# Patient Record
Sex: MALE | Race: WHITE | NOT HISPANIC OR LATINO | Employment: OTHER | URBAN - METROPOLITAN AREA
[De-identification: names, ages, dates, MRNs, and addresses within clinical notes are randomized per-mention and may not be internally consistent; named-entity substitution may affect disease eponyms.]

---

## 2022-08-02 ENCOUNTER — IN-CLINIC DEVICE VISIT (OUTPATIENT)
Dept: CARDIOLOGY CLINIC | Facility: CLINIC | Age: 87
End: 2022-08-02
Payer: COMMERCIAL

## 2022-08-02 ENCOUNTER — OFFICE VISIT (OUTPATIENT)
Dept: CARDIOLOGY CLINIC | Facility: CLINIC | Age: 87
End: 2022-08-02
Payer: COMMERCIAL

## 2022-08-02 VITALS — DIASTOLIC BLOOD PRESSURE: 66 MMHG | WEIGHT: 234.7 LBS | SYSTOLIC BLOOD PRESSURE: 120 MMHG | HEART RATE: 70 BPM

## 2022-08-02 DIAGNOSIS — Z95.0 CARDIAC PACEMAKER IN SITU: Primary | ICD-10-CM

## 2022-08-02 DIAGNOSIS — R06.09 DYSPNEA ON EXERTION: Primary | ICD-10-CM

## 2022-08-02 PROCEDURE — 93000 ELECTROCARDIOGRAM COMPLETE: CPT | Performed by: INTERNAL MEDICINE

## 2022-08-02 PROCEDURE — 93280 PM DEVICE PROGR EVAL DUAL: CPT | Performed by: INTERNAL MEDICINE

## 2022-08-02 PROCEDURE — 99204 OFFICE O/P NEW MOD 45 MIN: CPT | Performed by: INTERNAL MEDICINE

## 2022-08-02 RX ORDER — ATORVASTATIN CALCIUM 10 MG/1
10 TABLET, FILM COATED ORAL DAILY
COMMUNITY
Start: 2022-07-05

## 2022-08-02 RX ORDER — LOSARTAN POTASSIUM 100 MG/1
TABLET ORAL
COMMUNITY
Start: 2022-06-29

## 2022-08-02 RX ORDER — BISOPROLOL FUMARATE 5 MG/1
5 TABLET, FILM COATED ORAL DAILY
COMMUNITY
Start: 2022-06-29

## 2022-08-02 NOTE — PROGRESS NOTES
Results for orders placed or performed in visit on 08/02/22   Cardiac EP device report    Narrative    MDT Benson Hudson INTERROGATED IN THE [place] OFFICE  PT NEW TO DEVICE CLINIC  BATTERY VOLTAGE NEARING GABINO (9 MOS, <5-13 MOS)  WILL SCHEDULE MONTHLY BATTERY CHECKS  AP-85%, >99% (>40% Collie@[employer] OFF)  ALL LEAD PARAMETERS WITHIN NORMAL LIMITS  1 AFLUTTER EPISODE ON 3/26/22 LASTING 72 SEC  PT ON ASA 81MG & BISOPROLOL  PT SEEN BY DR DYE TODAY  NORMAL DEVICE FUNCTION   GV

## 2022-08-02 NOTE — PROGRESS NOTES
Outpatient Consultation - General Cardiology   Andres [address] 80 y o  male   MRN: [id number]  Encounter: [id number]      PCP: No primary care provider on file  Risk factors:  -HTN, HLD    History of Present Illness   Physician Requesting Consult: Consults   Reason for Consult / Principal Problem: Establish with Cardiology     HPI: Marline Rosado is a 80y o  year old male, dual-chamber pacemaker placement, hyperlipidemia, hypertension presenting this does not was with Cardiology after moving back here from [facility]  The patient was living in [facility] for the past 12 years and recently moved to [facility] the live with his daughter  He does pretty well functionally and works out in the garden groans were min of that shovels including to meet is, cucumbers, basal, and eggplant  He does occasionally complain of dyspnea on exertion while working in the garden but he believes this is due to the lung disease associated with [employer] work  The patient was a  for years and he had a lot of work exposure and believed this affected his breathing  He does deny chest pains at rest or on exertion  Any did denies any heart failure symptoms of orthopnea and pnd  Past medical: Dual-chamber pacemaker place a few years ago in [place], hypertension, hyperlipidemia  Social: Drinks 1 glass of wine a day, no extensive smoking history or other illicit drug use  Family: No pertinent      Review of Systems  Review of system was conducted and was negative except for as stated in the HPI  Historical Information   No past medical history on file  No past surgical history on file    Social History     Substance and Sexual Activity   Alcohol Use Not on file     Social History     Substance and Sexual Activity   Drug Use Not on file     Social History     Tobacco Use   Smoking Status Not on file   Smokeless Tobacco Not on file     Family History: non-contributory    Meds/Allergies   Home Medications: No current outpatient medications on file  Not on File      Objective   Vitals: There were no vitals taken for this visit  Physical Exam    GEN: Bindu Noel appears well, alert and oriented x 3, pleasant and cooperative   HEENT:  Normocephalic, atraumatic, anicteric, moist mucous membranes  NECK: No JVD or carotid bruits   HEART: regular rhythm, regular rate, normal S1 and S2, , clicks, gallops or rubs, II/VI right sternal border systolic murmur, no diastolic murmur, no fadumo flint murmur   LUNGS: Clear to auscultation bilaterally; no wheezes, rales, or rhonchi; respiration nonlabored   ABDOMEN:  Normoactive bowel sounds, soft, no tenderness, no distention  EXTREMITIES: peripheral pulses palpable; no edema  NEURO: no gross focal findings; cranial nerves grossly intact   SKIN:  Dry, intact, warm to touch    Lab Results: I have personally reviewed pertinent lab results  No results found for: HSTNI0, HSTNI2, HSTNI4, HSTNI  No results found for: NTBNP  No results found for: CHOL, TRIG, HDL, LDLCALC, LDLDIRECT  No results found for: NA, K, CO2, CL, BUN, CREATININE, ALT, AST, TSH  No results found for: WBC, HGB, HCT, MCV, PLT  No results found for: INR      Imaging: I have personally reviewed pertinent reports  EKG:         DEVICE INTERROGATION      Previous STRESS TEST:  No results found for this or any previous visit  No results found for this or any previous visit  No results found for this or any previous visit  Previous Cath/PCI:  No results found for this or any previous visit  No results found for this or any previous visit  No results found for this or any previous visit  ECHO:  No results found for this or any previous visit  No results found for this or any previous visit  MARLON:  No results found for this or any previous visit  No results found for this or any previous visit  CMR:  No results found for this or any previous visit      No results found for this or any previous visit  No results found for this or any previous visit  HOLTER  No results found for this or any previous visit  No results found for this or any previous visit  Assessment/Plan         Dual-Chamber Pacer: AV-paced on ecg  Unclear why the patient had it placed in [facility] but sounds like he was fairly asymptomatic but found to have significant conduction disease   -establish with the device clinic here and will follow-up with reports     Mild Dyspnea on exertion with no anginal symptoms: 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  Possible lung disease from work exposures but also he has a faint systolic murmur on exam at the sternal border  No parvus tardus, s3/s4, on exam or radiation to the carotids  -will follow-up TTE    HTN:  -well controlled on home medications  Plan to continue losartan 100 mg and bisoprolol 5 mg daily     HLD:  -repeat lipid panel here  -lipitor 10 mg daily     TO DO :  -could discuss discontinuing ASA 81 mg in the future but to continue now since he has been tolerating   -TTE  -follow-up in 6 months        Case discussed and reviewed with Dr Dominic Goodson who agrees with my assessment and plan  Thank you for involving us in the care of your patient  Cristhian Tilley MD  Cardiology Fellow PGY-5      ==========================================================================================    Epic/ Allscripts/Care Everywhere records reviewed:     ** Please Note: Fluency DirectDictation voice to text software may have been used in the creation of this document   **

## 2022-08-15 ENCOUNTER — HOSPITAL ENCOUNTER (OUTPATIENT)
Dept: NON INVASIVE DIAGNOSTICS | Facility: CLINIC | Age: 87
Discharge: HOME | End: 2022-08-15
Payer: COMMERCIAL

## 2022-08-15 VITALS
DIASTOLIC BLOOD PRESSURE: 66 MMHG | HEART RATE: 75 BPM | HEIGHT: 69 IN | WEIGHT: 234 LBS | SYSTOLIC BLOOD PRESSURE: 120 MMHG | BODY MASS INDEX: 34.66 KG/M2

## 2022-08-15 DIAGNOSIS — R06.09 DYSPNEA ON EXERTION: ICD-10-CM

## 2022-08-15 LAB
AORTIC ROOT: 4.1 CM
AORTIC VALVE MEAN VELOCITY: 18.7 M/S
APICAL FOUR CHAMBER EJECTION FRACTION: 56 %
ASCENDING AORTA: 4.7 CM
AV AREA BY CONTINUOUS VTI: 1.6 CM2
AV AREA PEAK VELOCITY: 1.4 CM2
AV LVOT MEAN GRADIENT: 1 MMHG
AV LVOT PEAK GRADIENT: 3 MMHG
AV MEAN GRADIENT: 16 MMHG
AV PEAK GRADIENT: 27 MMHG
AV VALVE AREA: 1.61 CM2
AV VELOCITY RATIO: 0.32
DOP CALC AO PEAK VEL: 2.6 M/S
DOP CALC AO VTI: 55.39 CM
DOP CALC LVOT AREA: 4.52 CM2
DOP CALC LVOT DIAMETER: 2.4 CM
DOP CALC LVOT PEAK VEL VTI: 19.74 CM
DOP CALC LVOT PEAK VEL: 0.82 M/S
DOP CALC LVOT STROKE VOLUME: 89.26
E WAVE DECELERATION TIME: 347 MS
FRACTIONAL SHORTENING: 31 (ref 28–44)
INTERVENTRICULAR SEPTUM IN DIASTOLE (PARASTERNAL SHORT AXIS VIEW): 1.5 CM
INTERVENTRICULAR SEPTUM: 1.5 CM (ref 0.6–1.1)
LAAS-AP2: 21.4 CM2
LAAS-AP4: 14.5 CM2
LEFT ATRIUM AREA SYSTOLE SINGLE PLANE A4C: 14.5 CM2
LEFT ATRIUM SIZE: 4.2 CM
LEFT INTERNAL DIMENSION IN SYSTOLE: 3.5 CM (ref 2.1–4)
LEFT VENTRICLE DIASTOLIC VOLUME (MOD BIPLANE): 104 ML
LEFT VENTRICLE SYSTOLIC VOLUME (MOD BIPLANE): 44 ML
LEFT VENTRICULAR INTERNAL DIMENSION IN DIASTOLE: 5.1 CM (ref 3.5–6)
LEFT VENTRICULAR POSTERIOR WALL IN END DIASTOLE: 1.5 CM
LEFT VENTRICULAR STROKE VOLUME: 72 ML
LV EF: 58 %
LVSV (TEICH): 72 ML
MV E'TISSUE VEL-SEP: 5 CM/S
MV PEAK A VEL: 1.36 M/S
MV PEAK E VEL: 79 CM/S
MV STENOSIS PRESSURE HALF TIME: 101 MS
MV VALVE AREA P 1/2 METHOD: 2.18
RIGHT ATRIUM AREA SYSTOLE A4C: 10.3 CM2
RIGHT VENTRICLE ID DIMENSION: 3.8 CM
SL CV LEFT ATRIUM LENGTH A2C: 5.6 CM
SL CV LV EF: 65
SL CV PED ECHO LEFT VENTRICLE DIASTOLIC VOLUME (MOD BIPLANE) 2D: 122 ML
SL CV PED ECHO LEFT VENTRICLE SYSTOLIC VOLUME (MOD BIPLANE) 2D: 50 ML

## 2022-08-15 PROCEDURE — 93306 TTE W/DOPPLER COMPLETE: CPT

## 2022-08-15 PROCEDURE — 93306 TTE W/DOPPLER COMPLETE: CPT | Performed by: INTERNAL MEDICINE

## 2022-09-08 ENCOUNTER — IN-CLINIC DEVICE VISIT (OUTPATIENT)
Dept: CARDIOLOGY CLINIC | Facility: CLINIC | Age: 87
End: 2022-09-08

## 2022-09-08 DIAGNOSIS — Z95.0 CARDIAC PACEMAKER IN SITU: Primary | ICD-10-CM

## 2022-09-08 PROCEDURE — RECHECK: Performed by: INTERNAL MEDICINE

## 2022-09-08 NOTE — PROGRESS NOTES
Results for orders placed or performed in visit on 09/08/22   Cardiac EP device report    Narrative    MDT DUAL PM/ ACTIVE SYSTEM IS MRI CONDITIONAL  DEVICE INTERROGATED IN THE [place] OFFICE TO CHECK BATTERY STATUS- NB  BATTERY VOLTAGE NEARING GABINO (9 MOS, <5-13 MOS)  WILL SCHEDULE MONTHLY BATTERY CHECKS  AP-88%, -98% (>40% [email or web address] OFF)  ALL AVAILABLE LEAD PARAMETERS WITHIN NORMAL LIMITS  NO SIGNIFICANT HIGH RATE EPISODES  NORMAL DEVICE FUNCTION   GV

## 2022-09-20 ENCOUNTER — TELEPHONE (OUTPATIENT)
Dept: CARDIOLOGY CLINIC | Facility: CLINIC | Age: 87
End: 2022-09-20

## 2022-10-10 ENCOUNTER — IN-CLINIC DEVICE VISIT (OUTPATIENT)
Dept: CARDIOLOGY CLINIC | Facility: CLINIC | Age: 87
End: 2022-10-10

## 2022-10-10 DIAGNOSIS — Z95.0 PRESENCE OF PERMANENT CARDIAC PACEMAKER: Primary | ICD-10-CM

## 2022-10-10 PROCEDURE — RECHECK: Performed by: INTERNAL MEDICINE

## 2022-10-10 NOTE — PROGRESS NOTES
Results for orders placed or performed in visit on 10/10/22   Cardiac EP device report    Narrative    MDT DUAL PM/ ACTIVE SYSTEM IS MRI CONDITIONAL  DEVICE INTERROGATED IN THE [place] OFFICE N/B-- BATTERY VOLTAGE NEARING GABINO  WILL SCHEDULE MONTHLY BATTERY CHECKS  (8 MONS)  AP 90%  98%  ALL AVAILABLE LEAD PARAMETERS WITHIN NORMAL LIMITS  NO SIGNIFICANT HIGH RATE EPISODES  NORMAL DEVICE FUNCTION  ---BELCHER

## 2022-11-14 ENCOUNTER — IN-CLINIC DEVICE VISIT (OUTPATIENT)
Dept: CARDIOLOGY CLINIC | Facility: CLINIC | Age: 87
End: 2022-11-14

## 2022-11-14 DIAGNOSIS — Z95.0 PRESENCE OF PERMANENT CARDIAC PACEMAKER: Primary | ICD-10-CM

## 2022-11-14 NOTE — PROGRESS NOTES
Results for orders placed or performed in visit on 11/14/22   Cardiac EP device report    Narrative    MDT DUAL PM/ ACTIVE SYSTEM IS MRI CONDITIONAL  DEVICE INTERROGATED IN THE [place] OFFICE N/B  BATTERY VOLTAGE NEARING GABINO  WILL SCHEDULE MONTHLY BATTERY CHECKS (7 MTHS)  AP 89 4%  99 2 % ALL LEAD PARAMETERS WITHIN NORMAL LIMITS  NO SIGNIFICANT HIGH RATE EPISODES  NORMAL DEVICE FUNCTION   AM/NC

## 2023-01-05 ENCOUNTER — IN-CLINIC DEVICE VISIT (OUTPATIENT)
Dept: CARDIOLOGY CLINIC | Facility: CLINIC | Age: 88
End: 2023-01-05

## 2023-01-05 DIAGNOSIS — Z95.0 PRESENCE OF PERMANENT CARDIAC PACEMAKER: Primary | ICD-10-CM

## 2023-01-05 NOTE — PROGRESS NOTES
Results for orders placed or performed in visit on 01/05/23   Cardiac EP device report    Narrative    MDT DUAL PM/ ACTIVE SYSTEM IS MRI CONDITIONAL  DEVICE INTERROGATED IN THE [place] OFFICE  (NO TEST) BATTERY VOLTAGE NEARING GABINO  WILL SCHEDULE MONTHLY BATTERY CHECKS  (6 MONS) AP 86%  99%  ALL AVAILABLE LEAD PARAMETERS WITHIN NORMAL LIMITS  NORMAL DEVICE FUNCTION  ---BELCHER

## 2023-02-21 ENCOUNTER — IN-CLINIC DEVICE VISIT (OUTPATIENT)
Dept: CARDIOLOGY CLINIC | Facility: CLINIC | Age: 88
End: 2023-02-21

## 2023-02-21 ENCOUNTER — OFFICE VISIT (OUTPATIENT)
Dept: CARDIOLOGY CLINIC | Facility: CLINIC | Age: 88
End: 2023-02-21

## 2023-02-21 VITALS
DIASTOLIC BLOOD PRESSURE: 80 MMHG | WEIGHT: 244 LBS | SYSTOLIC BLOOD PRESSURE: 122 MMHG | HEART RATE: 67 BPM | OXYGEN SATURATION: 98 % | BODY MASS INDEX: 36.03 KG/M2

## 2023-02-21 DIAGNOSIS — Z95.0 PRESENCE OF PERMANENT CARDIAC PACEMAKER: Primary | ICD-10-CM

## 2023-02-21 DIAGNOSIS — I35.0 NONRHEUMATIC AORTIC VALVE STENOSIS: Primary | ICD-10-CM

## 2023-02-21 RX ORDER — TAMSULOSIN HYDROCHLORIDE 0.4 MG/1
CAPSULE ORAL
COMMUNITY
Start: 2023-01-31

## 2023-02-21 RX ORDER — CELECOXIB 200 MG/1
200 CAPSULE ORAL 2 TIMES DAILY
COMMUNITY
Start: 2023-01-31

## 2023-02-21 NOTE — PROGRESS NOTES
Cardiology Follow Up    Andres Valerio  1935  [id number]  [facility]  [facility] [address]  [phone]    No diagnosis found  Interval History: Moved from [facility] about a year ago and currently lives with his daughter  He is fairly active and works in the garden regularly  Patient denies fevers/chills, nausea/vomiting, abdominal pain, diarrhea, cough, chest pain, shortness of breath, PND, orthopnea, presyncopal symptoms, syncopal episodes  There is no problem list on file for this patient  No past medical history on file  Social History     Socioeconomic History   • Marital status: Single     Spouse name: Not on file   • Number of children: Not on file   • Years of education: Not on file   • Highest education level: Not on file   Occupational History   • Not on file   Tobacco Use   • Smoking status: Never   • Smokeless tobacco: Never   Substance and Sexual Activity   • Alcohol use: Not on file   • Drug use: Not on file   • Sexual activity: Not on file   Other Topics Concern   • Not on file   Social History Narrative   • Not on file     Social Determinants of Health     Financial Resource Strain: Not on file   Food Insecurity: Not on file   Transportation Needs: Not on file   Physical Activity: Not on file   Stress: Not on file   Social Connections: Not on file   Intimate Partner Violence: Not on file   Housing Stability: Not on file      No family history on file  No past surgical history on file  Current Outpatient Medications:   •  ASPIRIN 81 PO, Take by mouth daily, Disp: , Rfl:   •  atorvastatin (LIPITOR) 10 mg tablet, Take 10 mg by mouth daily, Disp: , Rfl:   •  bisoprolol (ZEBETA) 5 mg tablet, Take 5 mg by mouth daily, Disp: , Rfl:   •  losartan (COZAAR) 100 MG tablet, , Disp: , Rfl:   No Known Allergies    Labs:not applicable  Imaging: No results found      Review of Systems:  Review of Systems   All other systems reviewed and are negative  Physical Exam:  Physical Exam  Vitals and nursing note reviewed  Constitutional:       Appearance: Normal appearance  HENT:      Head: Normocephalic and atraumatic  Eyes:      Extraocular Movements: Extraocular movements intact  Pupils: Pupils are equal, round, and reactive to light  Cardiovascular:      Rate and Rhythm: Normal rate and regular rhythm  Heart sounds: Murmur (III/VI systolic murmur, does not radiate and no delayed pulsus parvus et tardus) heard  Pulmonary:      Effort: Pulmonary effort is normal       Breath sounds: Normal breath sounds  Abdominal:      General: Abdomen is flat  Palpations: Abdomen is soft  Neurological:      General: No focal deficit present  Mental Status: He is alert and oriented to person, place, and time  Psychiatric:         Mood and Affect: Mood normal          Behavior: Behavior normal          Discussion/Summary:      Dual-Chamber Pacer: AV-paced on ecg with wide paced qrs most likely RV apical lead    -plan for generator change at GABINO     Mild-moderate aortic stenosis: mild dyspnea on exertion in the past with little complaints today by the patient or from the daughter  Euvolemic and nothing on exam concerning that stenosis is worse  -follow-up in clinic    Moderate to severely dilated Ascending aorta  The aortic root is 4 10 cm  The ascending aorta is 4 7 cm   -surveillance and not going to be repair candidate at this point        HTN:  -well controlled on home medications   Plan to continue losartan 100 mg and bisoprolol 5 mg daily      HLD:  -well controlled  -lipitor 10 mg daily      TO DO :  -device clinic and generator change with EP in 4 months at [facility]   -follow-up in 1 year

## 2023-02-21 NOTE — PROGRESS NOTES
Results for orders placed or performed in visit on 02/21/23   Cardiac EP device report    Narrative     [place] [address],Yasir B IS MRI CONDITIONAL  N/B DEVICE INTERROGATED IN THE [facility] OFFICE  BATTERY VOLTAGE NEARING GABINO (4 MTHS)  WILL SCHEDULE MONTHLY BATTERY CHECKS  AP 76 3%  99 9% (>40%/DDDR 60) ALL LEAD PARAMETERS WITHIN NORMAL LIMITS  2 VT-NS EPISODES ON SAME DAY WITH EGMS SHOWING NSVT WITH LONGEST 31 BEATS  BPM  TAKES BISOPROLOL  EF 65% (ECHO 8/15/22)  NORMAL DEVICE FUNCTION   AM/BELCHER

## 2023-03-22 ENCOUNTER — TELEPHONE (OUTPATIENT)
Dept: CARDIOLOGY CLINIC | Facility: CLINIC | Age: 88
End: 2023-03-22

## 2023-03-22 DIAGNOSIS — I47.29 NSVT (NONSUSTAINED VENTRICULAR TACHYCARDIA) (HCC): Primary | ICD-10-CM

## 2023-03-22 RX ORDER — BISOPROLOL FUMARATE 5 MG/1
10 TABLET, FILM COATED ORAL DAILY
Qty: 240 TABLET | Refills: 2 | Status: SHIPPED | OUTPATIENT
Start: 2023-03-22

## 2023-03-22 NOTE — TELEPHONE ENCOUNTER
----- Message from Ofe Young MD sent at 2/22/2023  4:21 AM EST -----  Normal Device Function  NSVT -31 beats   PPM  Increase beta blocker if tolerated

## 2023-03-22 NOTE — TELEPHONE ENCOUNTER
Discussed increased dosing of BB with the patient's daughter  The patient is planned to take 10 mg nightly now       Carolina Rios

## 2023-04-05 ENCOUNTER — IN-CLINIC DEVICE VISIT (OUTPATIENT)
Dept: CARDIOLOGY CLINIC | Facility: CLINIC | Age: 88
End: 2023-04-05

## 2023-04-05 DIAGNOSIS — Z95.0 PRESENCE OF PERMANENT CARDIAC PACEMAKER: Primary | ICD-10-CM

## 2023-04-05 NOTE — PROGRESS NOTES
Results for orders placed or performed in visit on 04/05/23   Cardiac EP device report    Narrative     Yasir [place] B IS MRI CONDITIONAL  N/B DEVICE INTERROGATED IN THE [address]  BATTERY VOLTAGE NEARING GABINO (3 MTHS)  WILL SCHEDULE MONTHLY BATTERY CHECKS  AP 75 9%  100% (>40%/DDDR 60) ALL LEAD PARAMETERS WITHIN NORMAL LIMITS  NO SIGNIFICANT HIGH RATE EPISODES  NORMAL DEVICE FUNCTION   AM/GV

## 2023-05-09 ENCOUNTER — IN-CLINIC DEVICE VISIT (OUTPATIENT)
Dept: CARDIOLOGY CLINIC | Facility: CLINIC | Age: 88
End: 2023-05-09

## 2023-05-09 DIAGNOSIS — Z95.0 PRESENCE OF PERMANENT CARDIAC PACEMAKER: Primary | ICD-10-CM

## 2023-05-09 NOTE — PROGRESS NOTES
Results for orders placed or performed in visit on 05/09/23   Cardiac EP device report    Narrative    MDT DUAL PM/ ACTIVE SYSTEM IS MRI CONDITIONAL  DEVICE INTERROGATED IN THE [place] OFFICE (NO TEST)  BATTERY VOLTAGE NEARING GABINO (1M)  WILL SCHEDULE MONTHLY BATTERY CHECKS  ALL LEAD PARAMETERS WITHIN NORMAL LIMITS  ONE NEW AT/AF EPISODE WITH EGM SHOWING AFLUTTER FOR 1 MIN  AT/AF BURDEN <0 1% SINCE 4/5/23  TAKES BISOPROLOL & ASA  NO PROGRAMMING CHANGES MADE TO DEVICE PARAMETERS  NORMAL DEVICE FUNCTION   AM/NC

## 2023-06-09 ENCOUNTER — IN-CLINIC DEVICE VISIT (OUTPATIENT)
Dept: CARDIOLOGY CLINIC | Facility: CLINIC | Age: 88
End: 2023-06-09

## 2023-06-09 DIAGNOSIS — Z95.0 CARDIAC PACEMAKER IN SITU: Primary | ICD-10-CM

## 2023-06-09 PROCEDURE — RECHECK: Performed by: INTERNAL MEDICINE

## 2023-06-09 NOTE — PROGRESS NOTES
Results for orders placed or performed in visit on 06/09/23   Cardiac EP device report    Narrative    MDT DUAL PM/ ACTIVE SYSTEM IS MRI CONDITIONAL  DEVICE INTERROGATED IN THE [place] OFFICE TO CHECK BATTERY STATUS- NB  BATTERY VOLTAGE NEARING GABINO (1 MO, <1-2 MOS)  WILL SCHEDULE MONTHLY BATTERY CHECKS  AP-79%, -100% (>40% Wallace@[employer] OFF)  ALL AVAILABLE LEAD PARAMETERS WITHIN NORMAL LIMITS  1 AFLUTTER EPISODE LASTING 1 5 MINS TREATED SUCCESSFULLY W/ rATP  PT ON ASA 81MG & BISOPROLOL  NORMAL DEVICE FUNCTION   GV

## 2023-06-19 DIAGNOSIS — I48.0 PAROXYSMAL ATRIAL FIBRILLATION (HCC): Primary | ICD-10-CM

## 2023-06-19 NOTE — PROGRESS NOTES
His device detected a short episode of atrial fibrillation and patient was asymptomatic  I discussed with the patient's daughter in-law who actively participates in the patient's healthcare decisions about the atrial fibrillation  I proposed the option of staring AC considering his CHADSVASC of 2 for his age and HTN which puts the patient at 4% annual stroke risk in comparison for his HASBLED of 2 with a bleeding risk around 4%  The other option I proposed was no AC and continue monitoring for more episodes of atrial fibrillation  The family elected to start [facility] and I Instructed them to start eliquis 5 mg BID and stop baby aspirin  The patient was just taking baby aspirin only for primary prevention       Heather Leahy

## 2023-07-19 ENCOUNTER — IN-CLINIC DEVICE VISIT (OUTPATIENT)
Dept: CARDIOLOGY CLINIC | Facility: CLINIC | Age: 88
End: 2023-07-19
Payer: COMMERCIAL

## 2023-07-19 ENCOUNTER — TELEPHONE (OUTPATIENT)
Dept: CARDIOLOGY CLINIC | Facility: CLINIC | Age: 88
End: 2023-07-19

## 2023-07-19 ENCOUNTER — OFFICE VISIT (OUTPATIENT)
Dept: CARDIOLOGY CLINIC | Facility: CLINIC | Age: 88
End: 2023-07-19
Payer: COMMERCIAL

## 2023-07-19 VITALS
BODY MASS INDEX: 36.29 KG/M2 | SYSTOLIC BLOOD PRESSURE: 130 MMHG | HEIGHT: 69 IN | HEART RATE: 132 BPM | WEIGHT: 245 LBS | DIASTOLIC BLOOD PRESSURE: 78 MMHG

## 2023-07-19 DIAGNOSIS — I47.29 NSVT (NONSUSTAINED VENTRICULAR TACHYCARDIA) (HCC): ICD-10-CM

## 2023-07-19 DIAGNOSIS — I48.0 PAROXYSMAL ATRIAL FIBRILLATION (HCC): ICD-10-CM

## 2023-07-19 DIAGNOSIS — Z95.0 PRESENCE OF PERMANENT CARDIAC PACEMAKER: Primary | ICD-10-CM

## 2023-07-19 DIAGNOSIS — Z95.0 CARDIAC PACEMAKER IN SITU: Primary | ICD-10-CM

## 2023-07-19 DIAGNOSIS — E78.5 HYPERLIPIDEMIA, UNSPECIFIED HYPERLIPIDEMIA TYPE: ICD-10-CM

## 2023-07-19 DIAGNOSIS — I10 HYPERTENSION, UNSPECIFIED TYPE: ICD-10-CM

## 2023-07-19 PROCEDURE — 99214 OFFICE O/P EST MOD 30 MIN: CPT | Performed by: PHYSICIAN ASSISTANT

## 2023-07-19 PROCEDURE — RECHECK: Performed by: INTERNAL MEDICINE

## 2023-07-19 NOTE — PATIENT INSTRUCTIONS
Your pacemaker battery is at the time where it needs to be replaced  This will be scheduled in the upcoming weeks  Nothing to eat or drink after midnight the evening before the procedure  Hold Eliquis the night before the procedure

## 2023-07-19 NOTE — PROGRESS NOTES
EPS Consultation/New Patient Evaluation   Heart & Vascular [address] [facility]  [place]    Name: Denise Wahl  : 1935  MRN: [id number]    ASSESSMENT:  1. Presence of permanent cardiac pacemaker  POCT ECG      2. Hyperlipidemia, unspecified hyperlipidemia type        3. Hypertension, unspecified type        4. Paroxysmal atrial fibrillation (HCC)            PLAN:  1. Medtronic dual chamber PPM at RRT  -- implanted 2018 in [place] for bradycardia  -- chronically elevated atrial lead threshold (>2.0 V @ 0.4 ms, programmed 4.75 V @ 0.4 ms)  -- pacemaker dependent (Ap 79%,  99%)  -- echo 2022 EF 65%  -- reached RRT 6/10/2023    2. PAF on Eliquis  -- 1.5 minutes noted on device interrogation in 2023  -- [id number] = 3 (age, HTN)  -- Eliquis and bisoprolol    3. HTN  -- BP controlled  -- continue bisoprolol and losartan    4. HLD  -- continue atorvastatin    5. Mild to moderate AS    6. Moderate to severely dilated ascending aorta      Andres Valerio presents for evaluation of his pacemaker today. His device reached RRT on Dee 10, 2023. The device was implanted in  in [place]. He is pacemaker dependent. He paces in the atrium approximately 80% of time and 100% in the ventricle. His atrial lead threshold is chronically elevated at > 2.0 V @ 0.4 ms (programmed 4.75 V @ 0.4 ms). I discussed the case with Dr. Lou Pino. He will return to the office to check atrial lead threshold in both unipolar and bipolar configuration. He will likely undergo generator change with Dr. Chuy Lobato next week, but based on reinterrogation of device we will consider atrial lead extraction and reimplantation. I discussed the procedure with both the patient and his daughter-in-law and they are in agreement. Regarding his paroxysmal atrial fibrillation, he will continue Eliquis 5 mg every 12 hours. He is on bisoprolol 10 mg daily.      His blood pressure is controlled today. He will continue bisoprolol and losartan. The office will contact him to schedule generator change. This will be arranged through his daughter-in-law. He will hold Eliquis the evening prior to the procedure. He will follow-up with the device clinic 2 weeks after generator change for wound check and device interrogation. He will call the office with any questions or concerns in the interim. CC/HPI:   Kae Enriquez is an 80year old [place] speaking male with a history of HTN, HLD, mild to moderate AS, moderate to severely dilated ascending aorta, Medtronic dual chamber PPM implanted in 2018 secondary to bradycardia (records not available at this time), and brief PAF on device interrogation now on Eliquis. His device reached RRT in June and he presents for evaluation. He lived in [place] for the past 12 years and moved back to the [address] [place] [address] in 2022. He lives with his son and daughter-in-law. He has several children in the area. He previously worked as a . He remains active and works in his garden. The pacemaker was implanted on 1/8/2018 by Dr. Purdence Blackwood in [place]. His daughter reports this was implanted for bradycardia. Medical records are not available at this time. The device is MR conditional. He has followed with our device clinic since August 2022. The atrial lead threshold is chronically elevated (>2.0 V @ 0.4 ms). He is A paced approximately 80% of the time and V paced 100% of the time. Echocardiogram in August 2022 documented an LVEF of 65%, mild to moderate AS, and moderate to severely dilated aortic root. He was noted to have brief PAF lasting 1.5 minutes in June 2023. He was started on Eliquis 5 mg every 12 hours. Aspirin was discontinued. His DIUMW4Lmub is 1 (age, HTN). He has not had any bleeding issues. Today, his device was interrogated in our device clinic and it was found to have reached RRT on 6/10/2023.  He presents for urgent evaluation regarding generator change. He is accompanied by his daughter-in-law. He has been feeling well. He denies any problems with the implant site. He denies chest pain, palpitations, lightheadedness, dizziness, syncope, or shortness of breath. EKG: AV paced rhythm HR 60 bpm    Device interrogation from today reviewed - see interrogation in chart for further details      Review of Systems   Constitutional: Negative. HENT: Negative. Eyes: Negative. Respiratory: Negative for chest tightness, shortness of breath and wheezing. Cardiovascular: Negative for chest pain and palpitations. Gastrointestinal: Negative for abdominal pain, nausea and vomiting. Endocrine: Negative. Genitourinary: Negative. Musculoskeletal: Negative. Skin: Negative for rash. Neurological: Negative for dizziness, syncope and weakness. Hematological: Negative. Psychiatric/Behavioral: Negative. Objective:     Vitals: Blood pressure 130/78, pulse (!) 132, height 5' 9" (1.753 m), weight 111 kg (245 lb). , Body mass index is 36.18 kg/m².,  HR is 60 bpm, artificially elevated     Physical Exam:   GEN: NAD, alert and oriented x 3, well appearing  SKIN: warm, dry without significant lesions or rashes. Left chest implant site C/D/I, well healed, no signs of infection. HEENT: NCAT, PERRL, EOMs intact  NECK: supple, no JVD appreciated  CARDIOVASCULAR: RRR, normal S1, S2 without murmurs, rubs, or gallops   LUNGS: CTA bilaterally without wheezes, rhonchi, or rales  ABDOMEN: Soft, nontender, nondistended.    EXTREMITIES/VASCULAR: perfused without clubbing, cyanosis, or LE edema b/l  PSYCH: Normal mood and affect  NEURO: CN ll-Xll grossly intact      Medications:      Current Outpatient Medications:   •  apixaban (Eliquis) 5 mg, Take 1 tablet (5 mg total) by mouth 2 (two) times a day, Disp: 120 tablet, Rfl: 5  •  atorvastatin (LIPITOR) 10 mg tablet, Take 10 mg by mouth daily, Disp: , Rfl:   •  bisoprolol (ZEBETA) 5 mg tablet, Take 2 tablets (10 mg total) by mouth daily, Disp: 240 tablet, Rfl: 2  •  celecoxib (CeleBREX) 200 mg capsule, Take 200 mg by mouth 2 (two) times a day, Disp: , Rfl:   •  losartan (COZAAR) 100 MG tablet, , Disp: , Rfl:   •  tamsulosin (FLOMAX) 0.4 mg, TAKE 1 CAPSULE BY MOUTH EVERY DAY AT NIGHT, Disp: , Rfl:   •  Cholecalciferol 250 MCG (54341 UT) CAPS, Take 10,000 Units by mouth daily, Disp: , Rfl:        Historical Information   History reviewed. No pertinent past medical history. History reviewed. No pertinent surgical history. Social History     Substance and Sexual Activity   Alcohol Use Yes   • Alcohol/week: 1.0 standard drink of alcohol   • Types: 1 Glasses of wine per week    Comment: 1 glass a day     Social History     Substance and Sexual Activity   Drug Use Not on file     Social History     Tobacco Use   Smoking Status Never   Smokeless Tobacco Never       History reviewed. No pertinent family history. Labs & Results:  Below is the patient's most recent value for Albumin, ALT, AST, BUN, Calcium, Chloride, Cholesterol, CO2, Creatinine, GFR, Glucose, HDL, Hematocrit, Hemoglobin, Hemoglobin A1C, LDL, Magnesium, Phosphorus, Platelets, Potassium, PSA, Sodium, Triglycerides, and WBC. Lab Results   Component Value Date    HGBA1C 5.5 01/30/2023     Note: for a comprehensive list of the patient's lab results, access the Results Review activity. Cardiac testing:    ECHO 8/15/2022:   •  Left Ventricle: Left ventricular cavity size is normal. Wall thickness is moderately increased. There is moderate concentric hypertrophy. The left ventricular ejection fraction is 65%. Systolic function is vigorous. Wall motion is normal. Diastolic function is normal.  •  Left Atrium: The atrium is mildly dilated. •  Aortic Valve: The aortic valve is trileaflet. The leaflets are mild to moderately thickened. The leaflets are mild to moderately calcified.  The leaflets exhibit mild to moderately reduced mobility There is trace regurgitation. There is mild to moderate stenosis. The aortic valve mean gradient is 16 mmHg. The aortic valve velocity is increased due to stenosis. •  Mitral Valve: There is moderate annular calcification. There is trace regurgitation. •  Tricuspid Valve: There is trace regurgitation. •  Aorta: The aortic root is mildly dilated. The ascending aorta is moderate to severely dilated. The aortic root is 4.10 cm. The ascending aorta is 4.7 cm.

## 2023-07-19 NOTE — H&P (VIEW-ONLY)
EPS Consultation/New Patient Evaluation   Heart & Vascular [address] [facility]  AVRIL Jimenez [place]    Name: Jairo Louis  : 1935  MRN: [id number]    ASSESSMENT:  1. Presence of permanent cardiac pacemaker  POCT ECG      2. Hyperlipidemia, unspecified hyperlipidemia type        3. Hypertension, unspecified type        4. Paroxysmal atrial fibrillation (HCC)            PLAN:  1. Medtronic dual chamber PPM at RRT  -- implanted 2018 in [place] for bradycardia  -- chronically elevated atrial lead threshold (>2.0 V @ 0.4 ms, programmed 4.75 V @ 0.4 ms)  -- pacemaker dependent (Ap 79%,  99%)  -- echo 2022 EF 65%  -- reached RRT 6/10/2023    2. PAF on Eliquis  -- 1.5 minutes noted on device interrogation in 2023  -- [id number] = 3 (age, HTN)  -- Eliquis and bisoprolol    3. HTN  -- BP controlled  -- continue bisoprolol and losartan    4. HLD  -- continue atorvastatin    5. Mild to moderate AS    6. Moderate to severely dilated ascending aorta      Andres Valerio presents for evaluation of his pacemaker today. His device reached RRT on Dee 10, 2023. The device was implanted in  in [place]. He is pacemaker dependent. He paces in the atrium approximately 80% of time and 100% in the ventricle. His atrial lead threshold is chronically elevated at > 2.0 V @ 0.4 ms (programmed 4.75 V @ 0.4 ms). I discussed the case with Dr. Lexy Woods. He will return to the office to check atrial lead threshold in both unipolar and bipolar configuration. He will likely undergo generator change with Dr. Nadia Rodriguez next week, but based on reinterrogation of device we will consider atrial lead extraction and reimplantation. I discussed the procedure with both the patient and his daughter-in-law and they are in agreement. Regarding his paroxysmal atrial fibrillation, he will continue Eliquis 5 mg every 12 hours. He is on bisoprolol 10 mg daily.      His blood pressure is controlled today. He will continue bisoprolol and losartan. The office will contact him to schedule generator change. This will be arranged through his daughter-in-law. He will hold Eliquis the evening prior to the procedure. He will follow-up with the device clinic 2 weeks after generator change for wound check and device interrogation. He will call the office with any questions or concerns in the interim. CC/HPI:   Army Blackman is an 80year old [place] speaking male with a history of HTN, HLD, mild to moderate AS, moderate to severely dilated ascending aorta, Medtronic dual chamber PPM implanted in 2018 secondary to bradycardia (records not available at this time), and brief PAF on device interrogation now on Eliquis. His device reached RRT in June and he presents for evaluation. He lived in [place] for the past 12 years and moved back to the [address] [place] [address] in 2022. He lives with his son and daughter-in-law. He has several children in the area. He previously worked as a . He remains active and works in his garden. The pacemaker was implanted on 1/8/2018 by Dr. Aixa Santana in [place]. His daughter reports this was implanted for bradycardia. Medical records are not available at this time. The device is MR conditional. He has followed with our device clinic since August 2022. The atrial lead threshold is chronically elevated (>2.0 V @ 0.4 ms). He is A paced approximately 80% of the time and V paced 100% of the time. Echocardiogram in August 2022 documented an LVEF of 65%, mild to moderate AS, and moderate to severely dilated aortic root. He was noted to have brief PAF lasting 1.5 minutes in June 2023. He was started on Eliquis 5 mg every 12 hours. Aspirin was discontinued. His [id number] is 1 (age, HTN). He has not had any bleeding issues. Today, his device was interrogated in our device clinic and it was found to have reached RRT on 6/10/2023.  He presents for urgent evaluation regarding generator change. He is accompanied by his daughter-in-law. He has been feeling well. He denies any problems with the implant site. He denies chest pain, palpitations, lightheadedness, dizziness, syncope, or shortness of breath. EKG: AV paced rhythm HR 60 bpm    Device interrogation from today reviewed - see interrogation in chart for further details      Review of Systems   Constitutional: Negative. HENT: Negative. Eyes: Negative. Respiratory: Negative for chest tightness, shortness of breath and wheezing. Cardiovascular: Negative for chest pain and palpitations. Gastrointestinal: Negative for abdominal pain, nausea and vomiting. Endocrine: Negative. Genitourinary: Negative. Musculoskeletal: Negative. Skin: Negative for rash. Neurological: Negative for dizziness, syncope and weakness. Hematological: Negative. Psychiatric/Behavioral: Negative. Objective:     Vitals: Blood pressure 130/78, pulse (!) 132, height 5' 9" (1.753 m), weight 111 kg (245 lb). , Body mass index is 36.18 kg/m².,  HR is 60 bpm, artificially elevated     Physical Exam:   GEN: NAD, alert and oriented x 3, well appearing  SKIN: warm, dry without significant lesions or rashes. Left chest implant site C/D/I, well healed, no signs of infection. HEENT: NCAT, PERRL, EOMs intact  NECK: supple, no JVD appreciated  CARDIOVASCULAR: RRR, normal S1, S2 without murmurs, rubs, or gallops   LUNGS: CTA bilaterally without wheezes, rhonchi, or rales  ABDOMEN: Soft, nontender, nondistended.    EXTREMITIES/VASCULAR: perfused without clubbing, cyanosis, or LE edema b/l  PSYCH: Normal mood and affect  NEURO: CN ll-Xll grossly intact      Medications:      Current Outpatient Medications:   •  apixaban (Eliquis) 5 mg, Take 1 tablet (5 mg total) by mouth 2 (two) times a day, Disp: 120 tablet, Rfl: 5  •  atorvastatin (LIPITOR) 10 mg tablet, Take 10 mg by mouth daily, Disp: , Rfl:   •  bisoprolol (ZEBETA) 5 mg tablet, Take 2 tablets (10 mg total) by mouth daily, Disp: 240 tablet, Rfl: 2  •  celecoxib (CeleBREX) 200 mg capsule, Take 200 mg by mouth 2 (two) times a day, Disp: , Rfl:   •  losartan (COZAAR) 100 MG tablet, , Disp: , Rfl:   •  tamsulosin (FLOMAX) 0.4 mg, TAKE 1 CAPSULE BY MOUTH EVERY DAY AT NIGHT, Disp: , Rfl:   •  Cholecalciferol 250 MCG (76631 UT) CAPS, Take 10,000 Units by mouth daily, Disp: , Rfl:        Historical Information   History reviewed. No pertinent past medical history. History reviewed. No pertinent surgical history. Social History     Substance and Sexual Activity   Alcohol Use Yes   • Alcohol/week: 1.0 standard drink of alcohol   • Types: 1 Glasses of wine per week    Comment: 1 glass a day     Social History     Substance and Sexual Activity   Drug Use Not on file     Social History     Tobacco Use   Smoking Status Never   Smokeless Tobacco Never       History reviewed. No pertinent family history. Labs & Results:  Below is the patient's most recent value for Albumin, ALT, AST, BUN, Calcium, Chloride, Cholesterol, CO2, Creatinine, GFR, Glucose, HDL, Hematocrit, Hemoglobin, Hemoglobin A1C, LDL, Magnesium, Phosphorus, Platelets, Potassium, PSA, Sodium, Triglycerides, and WBC. Lab Results   Component Value Date    HGBA1C 5.5 01/30/2023     Note: for a comprehensive list of the patient's lab results, access the Results Review activity. Cardiac testing:    ECHO 8/15/2022:   •  Left Ventricle: Left ventricular cavity size is normal. Wall thickness is moderately increased. There is moderate concentric hypertrophy. The left ventricular ejection fraction is 65%. Systolic function is vigorous. Wall motion is normal. Diastolic function is normal.  •  Left Atrium: The atrium is mildly dilated. •  Aortic Valve: The aortic valve is trileaflet. The leaflets are mild to moderately thickened. The leaflets are mild to moderately calcified.  The leaflets exhibit mild to moderately reduced mobility There is trace regurgitation. There is mild to moderate stenosis. The aortic valve mean gradient is 16 mmHg. The aortic valve velocity is increased due to stenosis. •  Mitral Valve: There is moderate annular calcification. There is trace regurgitation. •  Tricuspid Valve: There is trace regurgitation. •  Aorta: The aortic root is mildly dilated. The ascending aorta is moderate to severely dilated. The aortic root is 4.10 cm. The ascending aorta is 4.7 cm.

## 2023-07-19 NOTE — TELEPHONE ENCOUNTER
----- Message from Angelica Pagan PA-C sent at 7/19/2023  3:57 PM EDT -----  Hi,    Please schedule this patient for generator change with first available EP.      Medtronic dual chamber PPM  Reached RRT June 10th  Dependent    Needs generator change +/- atrial lead revision - need to discuss with the attending    Hold Eliquis the night before the procedure    Thanks,  Mary Thakur

## 2023-07-19 NOTE — PROGRESS NOTES
Results for orders placed or performed in visit on 07/19/23   Cardiac EP device report    Narrative    MDT DUAL PM/ ACTIVE SYSTEM IS MRI CONDITIONAL  DEVICE INTERROGATED IN THE [facility] OFFICE. BATTERY INDICATED GABINO AS OF 06/10/23. AP 79%  99%. ALL AVAILABLE LEAD PARAMETERS WITHIN NORMAL LIMITS. NO SIGNIFICANT HIGH RATE EPISODES. NORMAL DEVICE FUNCTION.  PATIENT TO SEE Anant Wolfe TODAY---BELCHER

## 2023-07-20 ENCOUNTER — IN-CLINIC DEVICE VISIT (OUTPATIENT)
Dept: CARDIOLOGY CLINIC | Facility: CLINIC | Age: 88
End: 2023-07-20

## 2023-07-20 DIAGNOSIS — Z95.0 PRESENCE OF PERMANENT CARDIAC PACEMAKER: Primary | ICD-10-CM

## 2023-07-20 PROBLEM — E78.5 HYPERLIPIDEMIA: Status: ACTIVE | Noted: 2023-07-20

## 2023-07-20 PROBLEM — I10 HYPERTENSION: Status: ACTIVE | Noted: 2023-07-20

## 2023-07-20 PROBLEM — I48.0 PAROXYSMAL ATRIAL FIBRILLATION (HCC): Status: ACTIVE | Noted: 2023-07-20

## 2023-07-20 PROCEDURE — RECHECK: Performed by: INTERNAL MEDICINE

## 2023-07-20 PROCEDURE — 93000 ELECTROCARDIOGRAM COMPLETE: CPT | Performed by: PHYSICIAN ASSISTANT

## 2023-07-20 NOTE — PROGRESS NOTES
Results for orders placed or performed in visit on 07/20/23   Cardiac EP device report    Narrative    MDT DUAL PM/ ACTIVE SYSTEM IS MRI CONDITIONAL  DEVICE INTERROGATED IN THE [place] OFFICE. N/B-- ATRIAL LEAD THRESHOLD ELEVATED BUT STABLE. TESTED BOTH UNIPOLAR AND BIPOLAR. DECREASE MADE TO AMPLITUDE AND PULSE WIDTH TO PROMOTE DEVICE LONGEVITY WHILE MAINTAINING AN APPROPRIATE SAFETY MARGIN.  PATIENTS FAMILY TO DISCUSS LEAD EXTRACTION OR JUST GEN CHANGE---BELCHER

## 2023-07-25 ENCOUNTER — PREP FOR PROCEDURE (OUTPATIENT)
Dept: CARDIOLOGY CLINIC | Facility: CLINIC | Age: 88
End: 2023-07-25

## 2023-07-25 DIAGNOSIS — I47.29 NSVT (NONSUSTAINED VENTRICULAR TACHYCARDIA) (HCC): ICD-10-CM

## 2023-07-25 DIAGNOSIS — Z95.0 PRESENCE OF PERMANENT CARDIAC PACEMAKER: Primary | ICD-10-CM

## 2023-07-25 NOTE — TELEPHONE ENCOUNTER
Jordon Shaikh approved [facility] # [id number] for DC pacer gen change/[id number] @ B w/Dr. Lara Carroll.   Valid 7/25/23-1/21/24

## 2023-07-25 NOTE — TELEPHONE ENCOUNTER
Patient scheduled for Dual chamber pacer gen change at [facility] on     08/03/2023 with Dr Sharif Bañuelos. Patient daughter in law, TWENTY [employer]ESTS, aware of general instructions, faxed lab and instructions to her. Meds holds:Losartan to be hold 24hrs prior     Can we please check insurance for approval.     Dr Sharif Bañuelos, can you please advise [facility] holds for this patient procedure. Thank you.

## 2023-07-28 NOTE — TELEPHONE ENCOUNTER
patient daughter in law, Obdulia, aware Eliquis to be hold the night prior and the morning of the procedure.

## 2023-08-03 ENCOUNTER — HOSPITAL ENCOUNTER (OUTPATIENT)
Facility: HOSPITAL | Age: 88
Setting detail: OUTPATIENT SURGERY
Discharge: HOME | End: 2023-08-03
Attending: INTERNAL MEDICINE | Admitting: INTERNAL MEDICINE
Payer: COMMERCIAL

## 2023-08-03 ENCOUNTER — ANESTHESIA (OUTPATIENT)
Dept: NON INVASIVE DIAGNOSTICS | Facility: HOSPITAL | Age: 88
End: 2023-08-03
Payer: COMMERCIAL

## 2023-08-03 ENCOUNTER — ANESTHESIA EVENT (OUTPATIENT)
Dept: NON INVASIVE DIAGNOSTICS | Facility: HOSPITAL | Age: 88
End: 2023-08-03
Payer: COMMERCIAL

## 2023-08-03 VITALS
WEIGHT: 235 LBS | SYSTOLIC BLOOD PRESSURE: 113 MMHG | RESPIRATION RATE: 17 BRPM | OXYGEN SATURATION: 91 % | TEMPERATURE: 97.6 F | DIASTOLIC BLOOD PRESSURE: 68 MMHG | BODY MASS INDEX: 35.61 KG/M2 | HEIGHT: 68 IN | HEART RATE: 59 BPM

## 2023-08-03 DIAGNOSIS — Z45.010 PACEMAKER AT END OF BATTERY LIFE: Primary | ICD-10-CM

## 2023-08-03 DIAGNOSIS — I47.29 NSVT (NONSUSTAINED VENTRICULAR TACHYCARDIA) (HCC): ICD-10-CM

## 2023-08-03 DIAGNOSIS — Z95.0 PRESENCE OF PERMANENT CARDIAC PACEMAKER: ICD-10-CM

## 2023-08-03 PROBLEM — I35.0 AORTIC STENOSIS: Status: ACTIVE | Noted: 2023-08-03

## 2023-08-03 PROBLEM — E66.9 CLASS 2 OBESITY WITH BODY MASS INDEX (BMI) OF 35.0 TO 35.9 IN ADULT: Status: ACTIVE | Noted: 2023-08-03

## 2023-08-03 LAB
ANION GAP SERPL CALCULATED.3IONS-SCNC: 5 MMOL/L
ATRIAL RATE: 63 BPM
ATRIAL RATE: 69 BPM
BUN SERPL-MCNC: 20 MG/DL (ref 5–25)
CALCIUM SERPL-MCNC: 8.8 MG/DL (ref 8.3–10.1)
CHLORIDE SERPL-SCNC: 112 MMOL/L (ref 96–108)
CO2 SERPL-SCNC: 27 MMOL/L (ref 21–32)
CREAT SERPL-MCNC: 0.79 MG/DL (ref 0.6–1.3)
ERYTHROCYTE [DISTWIDTH] IN BLOOD BY AUTOMATED COUNT: 13.6 % (ref 11.6–15.1)
GFR SERPL CREATININE-BSD FRML MDRD: 80 ML/MIN/1.73SQ M
GLUCOSE P FAST SERPL-MCNC: 95 MG/DL (ref 65–99)
GLUCOSE SERPL-MCNC: 95 MG/DL (ref 65–140)
HCT VFR BLD AUTO: 50.6 % (ref 36.5–49.3)
HGB BLD-MCNC: 17.2 G/DL (ref 12–17)
INR PPP: 1.09 (ref 0.84–1.19)
MCH RBC QN AUTO: 32 PG (ref 26.8–34.3)
MCHC RBC AUTO-ENTMCNC: 34 G/DL (ref 31.4–37.4)
MCV RBC AUTO: 94 FL (ref 82–98)
P AXIS: 48 DEGREES
PLATELET # BLD AUTO: 164 THOUSANDS/UL (ref 149–390)
PMV BLD AUTO: 9.9 FL (ref 8.9–12.7)
POTASSIUM SERPL-SCNC: 4.1 MMOL/L (ref 3.5–5.3)
PR INTERVAL: 206 MS
PR INTERVAL: 206 MS
PROTHROMBIN TIME: 14.3 SECONDS (ref 11.6–14.5)
QRS AXIS: -61 DEGREES
QRS AXIS: -65 DEGREES
QRSD INTERVAL: 208 MS
QRSD INTERVAL: 208 MS
QT INTERVAL: 498 MS
QT INTERVAL: 524 MS
QTC INTERVAL: 533 MS
QTC INTERVAL: 536 MS
RBC # BLD AUTO: 5.38 MILLION/UL (ref 3.88–5.62)
SODIUM SERPL-SCNC: 144 MMOL/L (ref 135–147)
T WAVE AXIS: 96 DEGREES
T WAVE AXIS: 98 DEGREES
VENTRICULAR RATE: 63 BPM
VENTRICULAR RATE: 69 BPM
WBC # BLD AUTO: 6.32 THOUSAND/UL (ref 4.31–10.16)

## 2023-08-03 PROCEDURE — 93005 ELECTROCARDIOGRAM TRACING: CPT

## 2023-08-03 PROCEDURE — 80048 BASIC METABOLIC PNL TOTAL CA: CPT | Performed by: PHYSICIAN ASSISTANT

## 2023-08-03 PROCEDURE — 85610 PROTHROMBIN TIME: CPT | Performed by: PHYSICIAN ASSISTANT

## 2023-08-03 PROCEDURE — C1785 PMKR, DUAL, RATE-RESP: HCPCS | Performed by: INTERNAL MEDICINE

## 2023-08-03 PROCEDURE — 85027 COMPLETE CBC AUTOMATED: CPT | Performed by: PHYSICIAN ASSISTANT

## 2023-08-03 PROCEDURE — 33228 REMV&REPLC PM GEN DUAL LEAD: CPT | Performed by: INTERNAL MEDICINE

## 2023-08-03 PROCEDURE — 93010 ELECTROCARDIOGRAM REPORT: CPT | Performed by: INTERNAL MEDICINE

## 2023-08-03 DEVICE — ENVELOPE CMRM6122 ABSORB MED MR
Type: IMPLANTABLE DEVICE | Site: CHEST  WALL | Status: FUNCTIONAL
Brand: TYRX™

## 2023-08-03 DEVICE — IPG W1DR01 AZURE XT DR MRI USA
Type: IMPLANTABLE DEVICE | Site: CHEST  WALL | Status: FUNCTIONAL
Brand: AZURE™ XT DR MRI SURESCAN™

## 2023-08-03 RX ORDER — SODIUM CHLORIDE 9 MG/ML
INJECTION, SOLUTION INTRAVENOUS CONTINUOUS PRN
Status: DISCONTINUED | OUTPATIENT
Start: 2023-08-03 | End: 2023-08-03

## 2023-08-03 RX ORDER — LIDOCAINE HYDROCHLORIDE 10 MG/ML
INJECTION, SOLUTION EPIDURAL; INFILTRATION; INTRACAUDAL; PERINEURAL CODE/TRAUMA/SEDATION MEDICATION
Status: DISCONTINUED | OUTPATIENT
Start: 2023-08-03 | End: 2023-08-03 | Stop reason: HOSPADM

## 2023-08-03 RX ORDER — PROPOFOL 10 MG/ML
INJECTION, EMULSION INTRAVENOUS CONTINUOUS PRN
Status: DISCONTINUED | OUTPATIENT
Start: 2023-08-03 | End: 2023-08-03

## 2023-08-03 RX ORDER — ACETAMINOPHEN 325 MG/1
650 TABLET ORAL EVERY 4 HOURS PRN
Status: DISCONTINUED | OUTPATIENT
Start: 2023-08-03 | End: 2023-08-03 | Stop reason: HOSPADM

## 2023-08-03 RX ORDER — ONDANSETRON 2 MG/ML
INJECTION INTRAMUSCULAR; INTRAVENOUS AS NEEDED
Status: DISCONTINUED | OUTPATIENT
Start: 2023-08-03 | End: 2023-08-03

## 2023-08-03 RX ORDER — GENTAMICIN SULFATE 40 MG/ML
INJECTION, SOLUTION INTRAMUSCULAR; INTRAVENOUS CODE/TRAUMA/SEDATION MEDICATION
Status: DISCONTINUED | OUTPATIENT
Start: 2023-08-03 | End: 2023-08-03 | Stop reason: HOSPADM

## 2023-08-03 RX ORDER — FENTANYL CITRATE 50 UG/ML
INJECTION, SOLUTION INTRAMUSCULAR; INTRAVENOUS AS NEEDED
Status: DISCONTINUED | OUTPATIENT
Start: 2023-08-03 | End: 2023-08-03

## 2023-08-03 RX ORDER — CEFAZOLIN SODIUM 2 G/50ML
2000 SOLUTION INTRAVENOUS ONCE
Status: COMPLETED | OUTPATIENT
Start: 2023-08-03 | End: 2023-08-03

## 2023-08-03 RX ADMIN — SODIUM CHLORIDE: 0.9 INJECTION, SOLUTION INTRAVENOUS at 10:57

## 2023-08-03 RX ADMIN — FENTANYL CITRATE 25 MCG: 50 INJECTION, SOLUTION INTRAMUSCULAR; INTRAVENOUS at 11:20

## 2023-08-03 RX ADMIN — CEFAZOLIN SODIUM 2000 MG: 2 SOLUTION INTRAVENOUS at 11:05

## 2023-08-03 RX ADMIN — PHENYLEPHRINE HYDROCHLORIDE 10 MCG/MIN: 10 INJECTION INTRAVENOUS at 11:02

## 2023-08-03 RX ADMIN — PROPOFOL 30 MCG/KG/MIN: 10 INJECTION, EMULSION INTRAVENOUS at 11:00

## 2023-08-03 RX ADMIN — ONDANSETRON 4 MG: 2 INJECTION INTRAMUSCULAR; INTRAVENOUS at 11:36

## 2023-08-03 NOTE — Clinical Note
The PACER GENERATOR LISA XT DR ZALDIVAR SURETRINIDAD - [id number] device was inserted. The leads were placed into the connector and visually verified to be in correct position. Injury current obtained.

## 2023-08-03 NOTE — ANESTHESIA POSTPROCEDURE EVALUATION
Post-Op Assessment Note    CV Status:  Stable  Pain Score: 0    Pain management: adequate     Mental Status:  Awake and alert   Hydration Status:  Stable and euvolemic   PONV Controlled:  None   Airway Patency:  Patent and adequate      Post Op Vitals Reviewed: Yes      Staff: CRNA         No notable events documented.     BP   116/86   Temp   97.2F   Pulse  59   Resp   16   SpO2   98%

## 2023-08-03 NOTE — ANESTHESIA PREPROCEDURE EVALUATION
Procedure:  Cardiac pacer generator change (Chest)    Relevant Problems   CARDIO   (+) Aortic stenosis   (+) Hyperlipidemia   (+) Hypertension   (+) Paroxysmal atrial fibrillation (HCC)   (+) Presence of permanent cardiac pacemaker      Other   (+) Class 2 obesity with body mass index (BMI) of 35.0 to 35.9 in adult     Medtronic dual chamber PPM at RRT  -- implanted 1/8/2018 in [place] for bradycardia  -- chronically elevated atrial lead threshold (>2.0 V @ 0.4 ms, programmed 4.75 V @ 0.4 ms)  -- pacemaker dependent (Ap 79%,  99%)  -- echo August 2022 EF 65%  -- reached RRT 6/10/2023  Physical Exam    Airway    Mallampati score: II  TM Distance: >3 FB  Neck ROM: full     Dental       Cardiovascular      Pulmonary      Other Findings        Anesthesia Plan  ASA Score- 3     Anesthesia Type- IV sedation with anesthesia with ASA Monitors. Additional Monitors:   Airway Plan:     Comment: Recent labs personally reviewed:  Lab Results       Component                Value               Date                       WBC                      6.32                08/03/2023                 HGB                      17.2 (H)            08/03/2023                 PLT                      164                 08/03/2023            Lab Results       Component                Value               Date                       K                        4.1                 08/03/2023                 BUN                      20                  08/03/2023                 CREATININE               0.79                08/03/2023            No results found for: "PTT"   Lab Results       Component                Value               Date                       INR                      1.09                08/03/2023              Blood type     I, Awa Ugarte MD, have personally seen and evaluated the patient prior to anesthetic care.   I have reviewed the pre-anesthetic record, medical history, allergies, medications and any other medical records if appropriate to the anesthetic care. If a CRNA is involved in the case, I have reviewed the CRNA assessment, if present, and agree. Patient consented for IV Sedation, general anesthesia as back up. Discussed risks of aspiration, IV infiltration, indications for conversion to general anesthesia. All questions and concerns addressed. .       Plan Factors-Exercise tolerance (METS): >4 METS. Chart reviewed. EKG reviewed. Imaging results reviewed. Existing labs reviewed. Patient summary reviewed. Patient is not a current smoker. Patient did not smoke on day of surgery. Obstructive sleep apnea risk education given perioperatively. Induction- intravenous. Postoperative Plan-     Informed Consent- Anesthetic plan and risks discussed with patient. I personally reviewed this patient with the CRNA. Discussed and agreed on the Anesthesia Plan with the CRNA. Dior Smith

## 2023-08-03 NOTE — DISCHARGE INSTR - AVS FIRST PAGE
Please refer to post pacemaker implantation discharge instructions and restrictions and your pacemaker booklet/temporary card. Keep incision dry for one week. Do not use lotions/powders/creams on incision. Leave outer bandage in place for 1 week - it is water proof, and as long as it is fully adhered to your skin you may shower with it. If it appears as though the bandage is coming off and/or there is any communication to the area of device incision, please then keep the whole area dry for the remaining week. After 1 week, please remove by pulling all edges away from the center of the bandage. Please call the office if you notice redness, swelling, bleeding, or drainage from incision or if you develop fevers. AFTER PACEMAKER CARE:    If you have any questions, please call [phone] to speak with a nurse (8:30am-4pm, or [phone] after hours). For appointments, please call [phone]. WHAT YOU SHOULD KNOW:   A pacemaker is a small, battery-powered device that is placed under your skin in your upper chest area with wires placed through a vein that lead directly into the heart. It helps regulate your heart rate and prevent your heart from beating too slowly. AFTER YOU LEAVE:     Medicines:     Pain medicine: You may need medicine to take away or decrease pain. Learn how to take your medicine. Ask what medicine and how much you should take. Be sure you know how, when, and how often to take it. Usually Over the counter pain medicine is sufficient to control pain (Acetominophen or Ibuprofen) Ask your doctor if you may take these. If this does not control your pain, narcotic pain killers may be prescribed, please call if you need prescription. Do not wait until the pain is severe before you take your medicine. Tell caregivers if your pain does not decrease. Pain medicine can make you dizzy or sleepy.  Prevent falls by calling someone when you get out of bed or if you need help. Take your medicine as directed. Call your healthcare provider if you think your medicine is not helping or if you have side effects. Tell him if you are allergic to any medicine. Follow up with your cardiologist after your procedure: You will need a follow-up visit approximately 2 weeks after you leave the hospital. Your cardiologist will check your wound and make sure that your pacemaker is working correctly. Follow the instructions to check your pacemaker: Your cardiologist or primary healthcare provider will check your pacemaker and the battery regularly. He will use a computer to check your pacemaker over the telephone or wireless device which will be given to you. Pacemaker batteries usually last 8 to 10 years. The pacemaker unit will be replaced when the battery gets low. This is a simpler procedure than the original one to implant your pacemaker. Wound care:  Keep your incision dry for one week. Do not use lotions/powders/creams on incision. Leave outer bandage in place for 1 week - it is water proof, and as long as it is fully adhered to your skin you may shower with it. If it appears as though the bandage is coming off and/or there is any communication to the area of device incision, please then keep the whole area dry for the remaining week. After 1 week, please remove by pulling all edges away from the center of the bandage. Please call the office if you notice redness, swelling, bleeding, or drainage from incision or if you develop fevers. Activity:   You may resume your normal activity following a generator change  Driving: you are able to drive 48 hours post PPM implant   Sports:  Ask your caregiver when it is okay to play tennis, golf, basketball, or any sport that requires you to lift your arms. Do not play full contact sports, such as football, that could damage your pacemaker.  Ask your cardiologist or primary healthcare provider how much and what kinds of physical activity are safe for you. Living with a pacemaker:   Tell all caregivers you have a pacemaker: This includes surgeons, radiologists, and medical technicians. You may want to wear a medical alert ID bracelet or necklace that states that you have a pacemaker. Carry your pacemaker ID card: Make sure you receive a pacemaker ID card. Carry it with you at all times. It lists important information about your pacemaker. Show it to airport security if you travel. Avoid electrical interference:  Avoid welding equipment and other equipment with large magnets or electric fields. These things could interfere with how your pacemaker works. Use your cell phone on the ear opposite from your pacemaker. Do not carry your cell phone in your shirt pocket over your chest.     Some Pacemakers are MRI safe. Ask you doctor if it is safe to proceed with MRI and let the radiologist and staff know you have a pacemaker. Do not touch the skin around your pacemaker: This can cause damage to the lead wires or move the pacemaker unit from where it should be. Contact your cardiologist or primary healthcare provider if:   The area around your pacemaker has increasing amount of pain after surgery. The pain should improve over first few days after implantation. The skin around your stitches has increasing redness, swelling, or has drainage. This may mean that you have an infection. You have a fever. You have chills, a cough, and feel weak or achy. These are also signs of infection. Your feet or ankles are more swollen than your baseline. Your Heart rate is less than 50 beats per minute     Seek care immediately if:   Your bandage becomes soaked with blood. Your pacemaker is swelling rapidly    Your stitches open up. You feel your heart suddenly beating very slowly or quickly. You become too weak or dizzy to stand, or you pass out. Your arm or leg feels warm, tender, and painful. It may look swollen and red. You have chest pain that does not go away with rest or medicine. You feel lightheaded, short of breath, and have chest pain. You cough up blood. © 2014 [facility] [facility] is for End User's use only and may not be sold, redistributed or otherwise used for commercial purposes. All illustrations and images included in CareNotes® are the copyrighted property of [employer] or [employer]. The above information is an  only. It is not intended as medical advice for individual conditions or treatments. Talk to your doctor, nurse or pharmacist before following any medical regimen to see if it is safe and effective for you.

## 2023-08-03 NOTE — INTERVAL H&P NOTE
Please see recent office visit with Ronald De La Torre for full details. Briefly this patient is a pleasant 80-year-old male with complete heart block with Medtronic dual-chamber pacemaker in situ, preserved LV systolic function with EF 65% per echo 8/2022, paroxysmal atrial fibrillation maintained on Eliquis, hypertension, hyperlipidemia, mild to moderate aortic stenosis, and obesity with BMI 35. He initially had his device implanted 1/2018 in [facility] for symptomatic bradycardia. Prior device interrogations have shown chronically elevated atrial lead threshold, thus he is programmed at 4.75 V at 0.4 ms. He is pacemaker dependent, with near 100% ventricular pacing, also with frequent atrial pacing. Through routine device interrogations, he was found to have reached GABINO as of 6/10/2023. He was seen in EP consultation to discuss further management. They discussed simple generator change, and possible need for atrial lead revision given the chronically elevated threshold. Ultimately after review with the physician, a lead revision will not be needed and instead he will undergo a simple generator change. He presents today to undergo that procedure. No significant changes over the recent past, physical exam unchanged.     Vitals:    08/03/23 0945   BP: 159/92   Pulse: 80   Resp: 18   Temp: 97.7 °F (36.5 °C)   SpO2: 94%

## 2023-08-18 ENCOUNTER — IN-CLINIC DEVICE VISIT (OUTPATIENT)
Dept: CARDIOLOGY CLINIC | Facility: CLINIC | Age: 88
End: 2023-08-18

## 2023-08-18 DIAGNOSIS — Z95.0 PRESENCE OF PERMANENT CARDIAC PACEMAKER: Primary | ICD-10-CM

## 2023-08-18 PROCEDURE — 99024 POSTOP FOLLOW-UP VISIT: CPT | Performed by: INTERNAL MEDICINE

## 2023-08-18 NOTE — PROGRESS NOTES
Results for orders placed or performed in visit on 08/18/23   Cardiac EP device report    Narrative    MDT [address] INTERROGATED IN THE [facility] OFFICE. BATTERY VOLTAGE ADEQUATE. (7.2 YRS) AP 84%  100%. ALL LEAD PARAMETERS WITHIN NORMAL LIMITS HOWEVER ATRIAL LEAD CHRONIC HIGH. NO SIGNIFICANT HIGH RATE EPISODES. NO PROGRAMMING CHANGES MADE TO DEVICE PARAMETERS. WOUND CHECK: INCISION CLEAN AND DRY WITH EDGES APPROXIMATED; WOUND CARE AND RESTRICTIONS REVIEWED WITH PATIENT. ---SIMI

## 2023-11-21 ENCOUNTER — REMOTE DEVICE CLINIC VISIT (OUTPATIENT)
Dept: CARDIOLOGY CLINIC | Facility: CLINIC | Age: 88
End: 2023-11-21
Payer: COMMERCIAL

## 2023-11-21 DIAGNOSIS — Z95.0 PRESENCE OF PERMANENT CARDIAC PACEMAKER: Primary | ICD-10-CM

## 2023-11-21 PROCEDURE — 93294 REM INTERROG EVL PM/LDLS PM: CPT | Performed by: INTERNAL MEDICINE

## 2023-11-21 PROCEDURE — 93296 REM INTERROG EVL PM/IDS: CPT | Performed by: INTERNAL MEDICINE

## 2023-11-21 NOTE — PROGRESS NOTES
MDT DUAL PM/ ACTIVE SYSTEM IS MRI CONDITIONAL   CARELINK TRANSMISSION:  BATTERY VOLTAGE ADEQUATE (6.9 YR.). AP 84.0%  99.9% (.40%/AVB/DEPENDENT/DDDR 60 PPM,  MS. ALL AVAILABLE LEAD PARAMETERS WITHIN NORMAL LIMITS. SINCE 8/18/2023;  3 EPISODES OF NSVT >=200 BPM (6 @ 231 BPM + 5 @ 231 BPM, 12 @ 228 BPM, 7 @ 200 BPM). 3 AT/AF EPISODES (<0.1%), LONGEST EPISODE 55 M. PATIENT TAKES ELIQUIS AND BISOPROLOL. EF 65% (ECHO 08/2022). TASK TO DR TAVERAS FOR REVIEW. NORMAL DEVICE FUNCTION.   RG

## 2024-02-14 ENCOUNTER — OFFICE VISIT (OUTPATIENT)
Dept: CARDIOLOGY CLINIC | Facility: CLINIC | Age: 89
End: 2024-02-14
Payer: COMMERCIAL

## 2024-02-14 VITALS
HEIGHT: 68 IN | SYSTOLIC BLOOD PRESSURE: 126 MMHG | DIASTOLIC BLOOD PRESSURE: 74 MMHG | BODY MASS INDEX: 36.78 KG/M2 | OXYGEN SATURATION: 96 % | WEIGHT: 242.7 LBS | HEART RATE: 94 BPM

## 2024-02-14 DIAGNOSIS — I10 HYPERTENSION, UNSPECIFIED TYPE: ICD-10-CM

## 2024-02-14 DIAGNOSIS — I77.810 AORTIC ROOT DILATATION (HCC): ICD-10-CM

## 2024-02-14 DIAGNOSIS — R60.0 BILATERAL LEG EDEMA: Primary | ICD-10-CM

## 2024-02-14 DIAGNOSIS — I35.0 AORTIC VALVE STENOSIS, ETIOLOGY OF CARDIAC VALVE DISEASE UNSPECIFIED: ICD-10-CM

## 2024-02-14 DIAGNOSIS — I48.0 PAROXYSMAL ATRIAL FIBRILLATION (HCC): ICD-10-CM

## 2024-02-14 DIAGNOSIS — E78.2 MIXED HYPERLIPIDEMIA: ICD-10-CM

## 2024-02-14 DIAGNOSIS — Z95.0 CARDIAC PACEMAKER IN SITU: ICD-10-CM

## 2024-02-14 PROBLEM — E66.01 OBESITY, MORBID (HCC): Status: ACTIVE | Noted: 2023-08-03

## 2024-02-14 PROCEDURE — 99215 OFFICE O/P EST HI 40 MIN: CPT | Performed by: NURSE PRACTITIONER

## 2024-02-14 RX ORDER — FUROSEMIDE 20 MG/1
TABLET ORAL
Qty: 3 TABLET | Refills: 10 | Status: SHIPPED | OUTPATIENT
Start: 2024-02-14

## 2024-02-14 NOTE — PROGRESS NOTES
Cardiology Follow Up    Andres Valerio  1935  [id number]  [facility] [place]  [address]  [place] [id number] [phone]    1. Bilateral leg edema  furosemide (LASIX) 20 mg tablet      2. Paroxysmal atrial fibrillation (HCC)  apixaban (Eliquis) 5 mg      3. Hypertension, unspecified type        4. Mixed hyperlipidemia        5. Cardiac pacemaker in situ        6. Aortic valve stenosis, etiology of cardiac valve disease unspecified        7. Aortic root dilatation (HCC)            Interval History:   Mr Andres Valerio presents to our office for a follow-up visit.  He is moving back to [place] at the end of the month.  He is accompanied by his daughter.  Otherwise severely hard of hearing and his daughter is assisting with communication.  Earlier admits to dyspnea with exertion denies chest pain palpitations lightheadedness or dizziness.  He admits to longstanding history of lower extremity edema.    Medical History   Primary Cardiologist Dr Haines  Hypertension  Hyperlipidemia  MDT dual chamber PPM gen change 8/03/23, 11/21/23 to twice interrogation battery 6.9 years, AP 84%,  99.9% all lead parameters within normal limits 3 episodes of NSVT 200 bpm.  6 beats at 231 bpm 5 beats at 231 bpm, 12 beats at 228 bpm, 7 beats at 200 bpm, 3 AT AF episodes longest 46 minutes.  LVEF 65%  Mild to mod AS  Mod to severe dilated Aorta   no further surveillance    Hgb A1C 5.5 on 1/30/23     8/15/22 EF 65% systolic function vigorous wall motion normal diastolic function normal.  Left atrium mildly dilated.  Aortic valve trileaflet mild to moderate late thickened mild to moderately calcified trace regurgitation mild to moderate stenosis aortic valve mean gradient 16 mmHg.  Trace MR, trace TR, a ascending aorta moderately to severely dilated aortic root 4.10 cm ascending aorta 4.7cm  Patient Active Problem List   Diagnosis    Presence of  permanent cardiac pacemaker    Hyperlipidemia    Hypertension    Paroxysmal atrial fibrillation (HCC)    Aortic stenosis    Class 2 obesity with body mass index (BMI) of 35.0 to 35.9 in adult    Pacemaker at end of battery life     No past medical history on file.  Social History     Socioeconomic History    Marital status: Single     Spouse name: Not on file    Number of children: Not on file    Years of education: Not on file    Highest education level: Not on file   Occupational History    Not on file   Tobacco Use    Smoking status: Never    Smokeless tobacco: Never   Substance and Sexual Activity    Alcohol use: Yes     Alcohol/week: 1.0 standard drink of alcohol     Types: 1 Glasses of wine per week     Comment: 1 glass a day    Drug use: Not on file    Sexual activity: Not on file   Other Topics Concern    Not on file   Social History Narrative    Not on file     Social Determinants of Health     Financial Resource Strain: Not on file   Food Insecurity: Not on file   Transportation Needs: Not on file   Physical Activity: Not on file   Stress: Not on file   Social Connections: Not on file   Intimate Partner Violence: Not on file   Housing Stability: Not on file      No family history on file.  Past Surgical History:   Procedure Laterality Date    CARDIAC ELECTROPHYSIOLOGY PROCEDURE N/A 8/3/2023    Procedure: Cardiac pacer generator change;  Surgeon: Reed Hill MD;  Location: [facility];  Service: Cardiology       Current Outpatient Medications:     apixaban (Eliquis) 5 mg, Take 1 tablet (5 mg total) by mouth 2 (two) times a day, Disp: 120 tablet, Rfl: 5    atorvastatin (LIPITOR) 10 mg tablet, Take 10 mg by mouth daily, Disp: , Rfl:     bisoprolol (ZEBETA) 5 mg tablet, Take 2 tablets (10 mg total) by mouth daily, Disp: 240 tablet, Rfl: 2    celecoxib (CeleBREX) 200 mg capsule, Take 200 mg by mouth 2 (two) times a day, Disp: , Rfl:     Cholecalciferol 250 MCG (18452 UT) CAPS, Take 10,000 Units by  mouth daily, Disp: , Rfl:     losartan (COZAAR) 100 MG tablet, , Disp: , Rfl:     tamsulosin (FLOMAX) 0.4 mg, TAKE 1 CAPSULE BY MOUTH EVERY DAY AT NIGHT, Disp: , Rfl:   No Known Allergies    Labs:  No visits with results within 2 Month(s) from this visit.   Latest known visit with results is:   Admission on 08/03/2023, Discharged on 08/03/2023   Component Date Value    Sodium 08/03/2023 144     Potassium 08/03/2023 4.1     Chloride 08/03/2023 112 (H)     CO2 08/03/2023 27     ANION GAP 08/03/2023 5     BUN 08/03/2023 20     Creatinine 08/03/2023 0.79     Glucose 08/03/2023 95     Glucose, Fasting 08/03/2023 95     Calcium 08/03/2023 8.8     eGFR 08/03/2023 80     WBC 08/03/2023 6.32     RBC 08/03/2023 5.38     Hemoglobin 08/03/2023 17.2 (H)     Hematocrit 08/03/2023 50.6 (H)     MCV 08/03/2023 94     MCH 08/03/2023 32.0     MCHC 08/03/2023 34.0     RDW 08/03/2023 13.6     Platelets 08/03/2023 164     MPV 08/03/2023 9.9     Protime 08/03/2023 14.3     INR 08/03/2023 1.09     Ventricular Rate 08/03/2023 69     Atrial Rate 08/03/2023 69     AL Interval 08/03/2023 206     QRSD Interval 08/03/2023 208     QT Interval 08/03/2023 498     QTC Interval 08/03/2023 533     P [place] 08/03/2023 48     QRS Axis 08/03/2023 -61     T Wave [place] 08/03/2023 98     Ventricular Rate 08/03/2023 63     Atrial Rate 08/03/2023 63     AL Interval 08/03/2023 206     QRSD Interval 08/03/2023 208     QT Interval 08/03/2023 524     QTC Interval 08/03/2023 536     QRS [place] 08/03/2023 -65     T Wave [place] 08/03/2023 96      Imaging: No results found.    Review of Systems:  Review of Systems   Constitutional:  Positive for fatigue.   HENT:  Positive for hearing loss.    Respiratory:  Positive for shortness of breath.    Cardiovascular:  Positive for leg swelling.   Musculoskeletal:  Positive for arthralgias, gait problem and myalgias.        Use a cane to assist with ambulation   All other systems reviewed and are negative.      Physical  Exam:  Physical Exam  Constitutional:       Appearance: Normal appearance. He is obese.   Neck:      Comments: Positive HJR  Cardiovascular:      Rate and Rhythm: Normal rate and regular rhythm.      Pulses: Normal pulses.      Heart sounds: Murmur heard.      Comments: 3/6 LISSETTE   Pulmonary:      Effort: Pulmonary effort is normal.      Breath sounds: Rales present.      Comments: Right base with rales does not clear with cough  Abdominal:      General: There is distension.      Comments: Firm to palpate   Musculoskeletal:      Cervical back: Normal range of motion and neck supple.      Right lower leg: Edema present.      Left lower leg: Edema present.      Comments: 1-2+ bilateral lower tibial edema   Skin:     General: Skin is warm and dry.      Capillary Refill: Capillary refill takes less than 2 seconds.   Neurological:      General: No focal deficit present.      Mental Status: He is alert and oriented to person, place, and time.   Psychiatric:         Mood and Affect: Mood normal.         Behavior: Behavior normal.         Discussion/Summary:  # Joseph LE edema multifactorial, right basal rales, +HJR, Lasix 20 mg daily for 3 days eat a banana daily  # Paroxysmal atrial fibrillation off AC reasons unknown.  [id number]= 3, denies history of bleeding issues.  Device interrogation shows PAF 46 minutes.  I have discussed this with the daughter and it was decided to place Phoebe back on Eliquis 5 mg twice daily.  He is on bisoprolol 10 mg daily samples provided at the office visit.  # Hypertension well-controlled on losartan 100 mg daily bisoprolol 10 mg daily  # Hyperlipidemia continue Lipitor 10 mg daily  # MDT dual chamber PPM gen change 8/03/23, 11/21/23 to twice interrogation battery 6.9 years, AP 84%,  99.9% all lead parameters within normal limits 3 episodes of NSVT 200 bpm.  6 beats at 231 bpm 5 beats at 231 bpm, 12 beats at 228 bpm, 7 beats at 200 bpm, 3 AT AF episodes longest 46 minutes.  LVEF 65%  # Mild to  mod AS  # Mod to severe dilated Aorta aortic root 4.10 cm ascending aorta 4.7cm  no further surveillance  Andres hawikns will be moving back to [place] at the end of the month.  I have advised the daughter to call "[employer] device to discuss home device monitoring and if it can be taken to [place].  I have advised Andres to follow-up with cardiology when he returns to [place].  According to his daughter she has all his medications for his moved back to [place].  I have advised him to wear lower extremity compression stockings during the plane ride .

## 2024-02-20 ENCOUNTER — REMOTE DEVICE CLINIC VISIT (OUTPATIENT)
Dept: CARDIOLOGY CLINIC | Facility: CLINIC | Age: 89
End: 2024-02-20
Payer: COMMERCIAL

## 2024-02-20 DIAGNOSIS — Z95.0 CARDIAC PACEMAKER IN SITU: Primary | ICD-10-CM

## 2024-02-20 PROCEDURE — 93294 REM INTERROG EVL PM/LDLS PM: CPT | Performed by: INTERNAL MEDICINE

## 2024-02-20 PROCEDURE — 93296 REM INTERROG EVL PM/IDS: CPT | Performed by: INTERNAL MEDICINE

## 2024-02-20 NOTE — PROGRESS NOTES
"Results for orders placed or performed in visit on 02/20/24   Cardiac EP device report    Narrative    MDT DUAL PM/ ACTIVE SYSTEM IS MRI CONDITIONAL  CARELINK TRANSMISSION: BATTERY STATUS \"6 YRS.\" AP 83%  100%. ALL AVAILABLE LEAD PARAMETERS WITHIN NORMAL LIMITS. 1 NSVT NOTED; APPROX 17 BEATS@ 158 BPM. PT ON BISOPROLOL & EF 65% (ECHO 2022). 7 AT/AF NOTED; 0% BURDEN; LONGEST 1.13 HRS. PT ON ELIQUIS. EGRAMS SHOWING AF. NORMAL DEVICE FUNCTION. NC         "

## 2024-02-21 ENCOUNTER — TELEPHONE (OUTPATIENT)
Dept: CARDIOLOGY CLINIC | Facility: CLINIC | Age: 89
End: 2024-02-21

## 2024-02-21 NOTE — TELEPHONE ENCOUNTER
Spoke to pt's daughter. She confirmed her dad is leaving for [place] on Saturday. I reviewed Anitha's message w/ her and offered to assist w/ scheduled STAT echo and she refused stating that would be difficult since he's currently in [place]. She mentioned that her dad has always had swelling and mentioned that to Catia. She claims he has had swelling for over 30 years. I did mentioned about establishing w/ cardiologist when he's in [place] and she confirmed he would b/c he has a device. I told her if anything changes and she wants him to get a STAT echo before he leaves to let us know. She understood and said ok.

## 2024-02-21 NOTE — TELEPHONE ENCOUNTER
----- Message from Gwen Garcia sent at 2/21/2024  7:31 AM EST -----  Can someone call & discuss as Anitha request. Thank you      ----- Message -----  From: Anitha Beauchamp PA-C  Sent: 2/20/2024   1:16 PM EST  To: Gwen Garcia    He has had NSVT in the past as well, was just seen last week by Catia ludwig. Historically his EF has been preserved and he is on a beta blocker.     Per Catia's note, he has been symptomatic recently with lower extremity edema. It sounds like he should have an echo, but he is leaving to go back to [place] at the end of this month.     Can you please call the patient (or his daughter) to see when he is going back? I doubt we could get an echo done before he leaves, but please reiterate that he should establish with a cardiologist as soon as he can once he is settled.     Thanks!      ----- Message -----  From: Gwen Garcia  Sent: 2/20/2024  10:24 AM EST  To: Cardiology Ep Provider    Nsvt on pacer

## (undated) DEVICE — PLASMABLADE PS200-040 4.0: Brand: PLASMABLADE™